# Patient Record
(demographics unavailable — no encounter records)

---

## 2017-09-27 NOTE — DIAGNOSTIC IMAGING REPORT
INDICATION: Fetal survey.



TECHNIQUE: Multiple real-time grayscale images were obtained over

the gravid uterus.



COMPARISON: None



FINDINGS: Fetal heart rate is 150 beats per minute. The placenta

is anterior. There is no placenta previa.



The cervix is long and closed. Its exact length is difficult to

estimate related to overlying myometrial contraction.



Adequate amniotic fluid is seen.



Fetal survey evaluation demonstrates unremarkable appearance of

the four-chamber view, stomach, two umbilical arteries, bladder,

no ventriculomegaly, and normal appearance of the cord insertion.

The spine is not well seen particularly at the mid and lower

segments due to fetal position.





Biometrical measurements are as follows:

Biparietal 5.11 cm, age 21 weeks 4 days. 23rd percentile.

Head circumference 19.44 cm, age 21 weeks 5 days. 20th

percentile.

Abdominal circumference 15.66 cm, age 20 weeks 6 days. This is a

the 9th percentile.

Femur length 3.62 cm, age 21 weeks 4 days. At 19th percentile.



Sonographic estimate age: 21 weeks 3 days.

Sonographic estimated date of delivery: 02/04/2018.



Estimated Fetal Weight: 405 gm (+/- 59  gm).

LMP percentile: 9%.



Fetal heart rate: 150 beats per minute.



Fetal number: 1 of 1.





IMPRESSION: The spine is not well seen due to fetal position.

Reevaluation within two weeks is suggested.



Dictated by: 



  Dictated on workstation # [id number]